# Patient Record
Sex: FEMALE | Race: WHITE | Employment: PART TIME | RURAL
[De-identification: names, ages, dates, MRNs, and addresses within clinical notes are randomized per-mention and may not be internally consistent; named-entity substitution may affect disease eponyms.]

---

## 2020-11-09 NOTE — PROGRESS NOTES
Chief Complaint   Patient presents with    Weight Gain       Have you seen any other physician or provider since your last visit no    Have you had any other diagnostic tests since your last visit? no    Have you changed or stopped any medications since your last visit? no     I have recommended that this patient have a flu shot and immunization for Tdap but she declines at this time. I have discussed the risks and benefits of this examination with her. The patient verbalizes understanding. Diabetic retinal exam completed this year?  N/A                       * If yes please have patient sign a records release to obtain record to update Health Maintenance                       * If no, please order referral for patient to be scheduled         Health Maintenance Due This Visit   Colonoscopy No   Mammogram No   Annual Wellness Visit No   Microalbumin No   HgbA1C No   Diabetic Eye Exam No    House Bill One Due This Visit   PETER Yes   UDS No   Contract No

## 2020-11-10 ENCOUNTER — HOSPITAL ENCOUNTER (OUTPATIENT)
Facility: HOSPITAL | Age: 20
Discharge: HOME | End: 2020-11-10
Payer: MEDICAID

## 2020-11-10 ENCOUNTER — OFFICE VISIT (OUTPATIENT)
Dept: FAMILY MEDICINE CLINIC | Age: 20
End: 2020-11-10
Payer: MEDICAID

## 2020-11-10 VITALS
HEART RATE: 107 BPM | TEMPERATURE: 98.3 F | HEIGHT: 67 IN | OXYGEN SATURATION: 98 % | WEIGHT: 255.4 LBS | RESPIRATION RATE: 18 BRPM | BODY MASS INDEX: 40.09 KG/M2 | SYSTOLIC BLOOD PRESSURE: 128 MMHG | DIASTOLIC BLOOD PRESSURE: 86 MMHG

## 2020-11-10 LAB
A/G RATIO: 1.8 (ref 0.8–2)
ALBUMIN SERPL-MCNC: 4.6 G/DL (ref 3.4–4.8)
ALP BLD-CCNC: 84 U/L (ref 25–100)
ALT SERPL-CCNC: 19 U/L (ref 4–36)
ANION GAP SERPL CALCULATED.3IONS-SCNC: 11 MMOL/L (ref 3–16)
AST SERPL-CCNC: 17 U/L (ref 8–33)
BASOPHILS ABSOLUTE: 0.1 K/UL (ref 0–0.1)
BASOPHILS RELATIVE PERCENT: 1 %
BILIRUB SERPL-MCNC: 0.5 MG/DL (ref 0.3–1.2)
BUN BLDV-MCNC: 15 MG/DL (ref 6–20)
CALCIUM SERPL-MCNC: 10.1 MG/DL (ref 8.5–10.5)
CHLORIDE BLD-SCNC: 103 MMOL/L (ref 98–107)
CO2: 23 MMOL/L (ref 20–30)
CREAT SERPL-MCNC: 0.7 MG/DL (ref 0.4–1.2)
EOSINOPHILS ABSOLUTE: 0.3 K/UL (ref 0–0.4)
EOSINOPHILS RELATIVE PERCENT: 3.1 %
ESTRADIOL LEVEL: 26 PG/ML
FOLLICLE STIMULATING HORMONE: 6.6 MIU/ML
GFR AFRICAN AMERICAN: >59
GFR NON-AFRICAN AMERICAN: >60
GLOBULIN: 2.5 G/DL
GLUCOSE BLD-MCNC: 108 MG/DL (ref 74–106)
HCT VFR BLD CALC: 47.9 % (ref 37–47)
HEMOGLOBIN: 14.6 G/DL (ref 11.5–16.5)
IMMATURE GRANULOCYTES #: 0 K/UL
IMMATURE GRANULOCYTES %: 0.4 % (ref 0–5)
LYMPHOCYTES ABSOLUTE: 1.3 K/UL (ref 1.5–4)
LYMPHOCYTES RELATIVE PERCENT: 16.4 %
MCH RBC QN AUTO: 26.6 PG (ref 27–32)
MCHC RBC AUTO-ENTMCNC: 30.5 G/DL (ref 31–35)
MCV RBC AUTO: 87.4 FL (ref 80–100)
MONOCYTES ABSOLUTE: 0.9 K/UL (ref 0.2–0.8)
MONOCYTES RELATIVE PERCENT: 10.8 %
NEUTROPHILS ABSOLUTE: 5.5 K/UL (ref 2–7.5)
NEUTROPHILS RELATIVE PERCENT: 68.3 %
PDW BLD-RTO: 13.2 % (ref 11–16)
PLATELET # BLD: 360 K/UL (ref 150–400)
PMV BLD AUTO: 9.3 FL (ref 6–10)
POTASSIUM SERPL-SCNC: 4.7 MMOL/L (ref 3.4–5.1)
RBC # BLD: 5.48 M/UL (ref 3.8–5.8)
SODIUM BLD-SCNC: 137 MMOL/L (ref 136–145)
TOTAL PROTEIN: 7.1 G/DL (ref 6.4–8.3)
TSH REFLEX: 1.68 UIU/ML (ref 0.35–5.5)
WBC # BLD: 8.1 K/UL (ref 4–11)

## 2020-11-10 PROCEDURE — 84270 ASSAY OF SEX HORMONE GLOBUL: CPT

## 2020-11-10 PROCEDURE — 82670 ASSAY OF TOTAL ESTRADIOL: CPT

## 2020-11-10 PROCEDURE — 83516 IMMUNOASSAY NONANTIBODY: CPT

## 2020-11-10 PROCEDURE — 99203 OFFICE O/P NEW LOW 30 MIN: CPT | Performed by: NURSE PRACTITIONER

## 2020-11-10 PROCEDURE — 85025 COMPLETE CBC W/AUTO DIFF WBC: CPT

## 2020-11-10 PROCEDURE — 83001 ASSAY OF GONADOTROPIN (FSH): CPT

## 2020-11-10 PROCEDURE — 84443 ASSAY THYROID STIM HORMONE: CPT

## 2020-11-10 PROCEDURE — 84403 ASSAY OF TOTAL TESTOSTERONE: CPT

## 2020-11-10 PROCEDURE — 80053 COMPREHEN METABOLIC PANEL: CPT

## 2020-11-10 RX ORDER — PHENTERMINE HYDROCHLORIDE 37.5 MG/1
37.5 TABLET ORAL
Qty: 30 TABLET | Refills: 0 | Status: SHIPPED | OUTPATIENT
Start: 2020-11-10 | End: 2020-12-10 | Stop reason: SDUPTHER

## 2020-11-10 RX ORDER — DICLOFENAC SODIUM 75 MG/1
75 TABLET, DELAYED RELEASE ORAL DAILY
COMMUNITY
Start: 2020-08-26

## 2020-11-10 RX ORDER — PRENATAL VIT/IRON FUM/FOLIC AC 27MG-0.8MG
0.8 TABLET ORAL DAILY
COMMUNITY
Start: 2020-09-15

## 2020-11-10 RX ORDER — LEVONORGESTREL AND ETHINYL ESTRADIOL 0.15-0.03
1 KIT ORAL DAILY
Qty: 1 PACKET | Refills: 3 | Status: SHIPPED | OUTPATIENT
Start: 2020-11-10 | End: 2020-12-10 | Stop reason: SDUPTHER

## 2020-11-10 SDOH — HEALTH STABILITY: MENTAL HEALTH: HOW OFTEN DO YOU HAVE A DRINK CONTAINING ALCOHOL?: MONTHLY OR LESS

## 2020-11-10 ASSESSMENT — PATIENT HEALTH QUESTIONNAIRE - PHQ9
2. FEELING DOWN, DEPRESSED OR HOPELESS: 0
SUM OF ALL RESPONSES TO PHQ QUESTIONS 1-9: 2
SUM OF ALL RESPONSES TO PHQ QUESTIONS 1-9: 2
1. LITTLE INTEREST OR PLEASURE IN DOING THINGS: 2
SUM OF ALL RESPONSES TO PHQ9 QUESTIONS 1 & 2: 2
SUM OF ALL RESPONSES TO PHQ QUESTIONS 1-9: 2

## 2020-11-12 LAB
SEX HORMONE BINDING GLOBULIN: 7 NMOL/L (ref 30–135)
SEX HORMONE BINDING GLOBULIN: 8 NMOL/L (ref 30–135)
TESTOSTERONE FREE-NONMALE: 12.9 PG/ML (ref 0.8–7.4)
TESTOSTERONE TOTAL: 40 NG/DL (ref 20–70)

## 2020-11-13 LAB — ANTI MULLERIAN HORMONE: 3.89 NG/ML (ref 0.4–16.02)

## 2020-11-18 ENCOUNTER — TELEPHONE (OUTPATIENT)
Dept: FAMILY MEDICINE CLINIC | Age: 20
End: 2020-11-18

## 2020-11-18 NOTE — TELEPHONE ENCOUNTER
Patient scheduled to see Dr. Domitila Salas (Gynecology) on 01/8/20 at 10. Patient's referral, along with all pertinent medical records faxed to the attention of scheduling on 11/18/20. I have mailed patient referral with appointment date/time/location.

## 2020-11-20 ASSESSMENT — ENCOUNTER SYMPTOMS
EYE REDNESS: 0
NAUSEA: 0
COUGH: 0
DIARRHEA: 0
CHEST TIGHTNESS: 0
EYE PAIN: 0
BACK PAIN: 0
SHORTNESS OF BREATH: 0
COLOR CHANGE: 0
VOMITING: 0
TROUBLE SWALLOWING: 0
ABDOMINAL PAIN: 0
SORE THROAT: 0

## 2020-11-21 NOTE — PROGRESS NOTES
SUBJECTIVE:    Patient ID: Shruthi Alcantara is a 23 y.o. female. Chief Complaint   Patient presents with    Weight Gain     Shruthi Alcantara is a 23 y.o. female here for discussion regarding weight loss. She has noted a weight gain of approximately 50 pounds over the last several months. She feels ideal weight is 190 pounds. Weight at graduation from high school was 190 pounds. History of eating disorders: none. There is a family history positive for obesity in the parents. Previous treatments for obesity include commercial weight loss program, OTC appetite suppressants, and self-directed dieting. Obesity associated medical conditions: amenorrhea. Obesity associated medications: none. Cardiovascular risk factors besides obesity: sedentary lifestyle. Active Ambulatory Problems     Diagnosis Date Noted    No Active Ambulatory Problems     Resolved Ambulatory Problems     Diagnosis Date Noted    No Resolved Ambulatory Problems     No Additional Past Medical History     Allergies   Allergen Reactions    Penicillins Rash      Past Surgical History:   Procedure Laterality Date    APPENDECTOMY      WRIST SURGERY        History reviewed. No pertinent family history. Patient's medications, allergies, past medical, surgical, social and family histories were reviewed and updated as appropriate. Current Outpatient Medications on File Prior to Visit   Medication Sig Dispense Refill    diclofenac (VOLTAREN) 75 MG EC tablet Take 75 mg by mouth daily      Prenatal Vit-Fe Fumarate-FA (PRENATAL VITAMIN) 27-0.8 MG TABS Take 0.8 mg by mouth daily       No current facility-administered medications on file prior to visit. Review of Systems   Constitutional: Positive for fatigue and unexpected weight change (gain over the past year). Negative for activity change, appetite change, chills and fever. HENT: Negative for congestion, ear pain, nosebleeds, sore throat and trouble swallowing.     Eyes: Negative for pain and redness. Respiratory: Negative for cough, chest tightness and shortness of breath. Cardiovascular: Negative for chest pain, palpitations and leg swelling. Gastrointestinal: Negative for abdominal pain, diarrhea, nausea and vomiting. Genitourinary: Negative for difficulty urinating, dysuria and flank pain. Musculoskeletal: Negative for arthralgias, back pain and gait problem. Skin: Negative for color change, pallor, rash and wound. Allergic/Immunologic: Negative for environmental allergies and food allergies. Neurological: Negative for dizziness, numbness and headaches. Hematological: Negative for adenopathy. Does not bruise/bleed easily. Psychiatric/Behavioral: Negative for agitation and sleep disturbance. The patient is not nervous/anxious. OBJECTIVE:  /86   Pulse 107   Temp 98.3 °F (36.8 °C) (Temporal)   Resp 18   Ht 5' 7\" (1.702 m)   Wt 255 lb 6.4 oz (115.8 kg)   SpO2 98% Comment: room air  BMI 40.00 kg/m²       Physical Exam  Vitals signs and nursing note reviewed. Constitutional:       General: She is not in acute distress. Appearance: Normal appearance. She is well-developed. She is obese. She is not ill-appearing, toxic-appearing or diaphoretic. Comments: Body Mass Index:  40.00 kg/m²     HENT:      Head: Normocephalic and atraumatic. Right Ear: Hearing, tympanic membrane, ear canal and external ear normal.      Left Ear: Hearing, tympanic membrane, ear canal and external ear normal.      Nose: Nose normal. No laceration, mucosal edema or rhinorrhea. Right Sinus: No maxillary sinus tenderness or frontal sinus tenderness. Left Sinus: No maxillary sinus tenderness or frontal sinus tenderness. Mouth/Throat:      Dentition: Normal dentition. No dental caries. Pharynx: Uvula midline. Tonsils: No tonsillar exudate. Eyes:      General: Lids are normal. No scleral icterus. Right eye: No discharge. Left eye: No discharge. Conjunctiva/sclera: Conjunctivae normal.      Pupils: Pupils are equal, round, and reactive to light. Neck:      Musculoskeletal: Full passive range of motion without pain, normal range of motion and neck supple. Thyroid: No thyroid mass or thyromegaly. Vascular: Normal carotid pulses. No carotid bruit, hepatojugular reflux or JVD. Trachea: Trachea and phonation normal. No tracheal tenderness or tracheal deviation. Cardiovascular:      Rate and Rhythm: Normal rate and regular rhythm. Pulses: Normal pulses. Heart sounds: Normal heart sounds, S1 normal and S2 normal. Heart sounds not distant. No murmur. No friction rub. No gallop. Pulmonary:      Effort: Pulmonary effort is normal. No tachypnea, bradypnea or accessory muscle usage. Breath sounds: Normal breath sounds. No stridor. No decreased breath sounds, wheezing, rhonchi or rales. Chest:      Chest wall: No tenderness. Abdominal:      General: Bowel sounds are normal. There is no distension. Palpations: Abdomen is soft. There is no hepatomegaly, splenomegaly or mass. Tenderness: There is no abdominal tenderness. There is no guarding or rebound. Hernia: No hernia is present. Musculoskeletal: Normal range of motion. General: No tenderness or deformity. Lymphadenopathy:      Cervical: No cervical adenopathy. Skin:     General: Skin is warm and dry. Capillary Refill: Capillary refill takes less than 2 seconds. Coloration: Skin is not pale. Findings: No bruising, erythema or rash. Neurological:      Mental Status: She is alert and oriented to person, place, and time. She is not disoriented. GCS: GCS eye subscore is 4. GCS verbal subscore is 5. GCS motor subscore is 6. Cranial Nerves: No cranial nerve deficit. Sensory: No sensory deficit. Motor: No abnormal muscle tone.       Coordination: Coordination normal.      Deep Tendon Reflexes: Reflexes normal.   Psychiatric:         Speech: Speech normal.         Behavior: Behavior normal.         Thought Content: Thought content normal.         Judgment: Judgment normal.         Results in Past 30 Days  Result Component Current Result Ref Range Previous Result Ref Range   Alb 4.6 (11/10/2020) 3.4 - 4.8 g/dL Not in Time Range    Albumin/Globulin Ratio 1.8 (11/10/2020) 0.8 - 2.0 Not in Time Range    Alkaline Phosphatase 84 (11/10/2020) 25 - 100 U/L Not in Time Range    ALT 19 (11/10/2020) 4 - 36 U/L Not in Time Range    AST 17 (11/10/2020) 8 - 33 U/L Not in Time Range    BUN 15 (11/10/2020) 6 - 20 mg/dL Not in Time Range    Calcium 10.1 (11/10/2020) 8.5 - 10.5 mg/dL Not in Time Range    Chloride 103 (11/10/2020) 98 - 107 mmol/L Not in Time Range    CO2 23 (11/10/2020) 20 - 30 mmol/L Not in Time Range    CREATININE 0.7 (11/10/2020) 0.4 - 1.2 mg/dL Not in Time Range    GFR  >59 (11/10/2020) >59 Not in Time Range    GFR Non- >60 (11/10/2020) >59 Not in Time Range    Globulin 2.5 (11/10/2020) g/dL Not in Time Range    Glucose 108 (H) (11/10/2020) 74 - 106 mg/dL Not in Time Range    Potassium 4.7 (11/10/2020) 3.4 - 5.1 mmol/L Not in Time Range    Sodium 137 (11/10/2020) 136 - 145 mmol/L Not in Time Range    Total Bilirubin 0.5 (11/10/2020) 0.3 - 1.2 mg/dL Not in Time Range    Total Protein 7.1 (11/10/2020) 6.4 - 8.3 g/dL Not in Time Range      No results found for: LABA1C, LABMICR, LDLCALC    Lab Results   Component Value Date    WBC 8.1 11/10/2020    NEUTROABS 5.5 11/10/2020    HGB 14.6 11/10/2020    HCT 47.9 11/10/2020    MCV 87.4 11/10/2020     11/10/2020     No results found for: TSH     ASSESSMENT/PLAN:     Mariama Jackman was seen today for weight gain. Diagnoses and all orders for this visit:    BMI 40.0-44.9, adult (HCC)  -     phentermine (ADIPEX-P) 37.5 MG tablet; Take 1 tablet by mouth every morning (before breakfast) for 30 days.     Menstrual periods, abnormal  - levonorgestrel-ethinyl estradiol (SEASONALE) 0.15-0.03 MG per tablet; Take 1 tablet by mouth daily  -     Estradiol; Future  -     Testosterone, free, total; Future  -     FOLLICLE STIMULATING HORMONE; Future  -     SEX HORMONE BINDING GLOBULIN; Future  -     ANTI MULLERIAN HORMONE; Future    Weight gain  -     External Referral To Ob-Gyn  -     COMPREHENSIVE METABOLIC PANEL; Future  -     TSH with Reflex; Future  -     CBC WITH AUTO DIFFERENTIAL; Future  -     Estradiol; Future  -     Testosterone, free, total; Future  -     FOLLICLE STIMULATING HORMONE; Future  -     SEX HORMONE BINDING GLOBULIN; Future  -     ANTI MULLERIAN HORMONE; Future    I had a long discussion with patient regarding the risk and complication from obesity. She is aware. I have a long discussion with her about the importance of following appropriate diet and exercise on a regular basis. I have discussed with her several diet option. Work up to rule out secondary causes of obesity will be done. Risks of dieting were reviewed, including fatigue, temporary hair loss, gallstone formation, gout, and with very low calorie diets, electrolyte abnormalities, nutrient inadequacies, and loss of lean body mass. Proper food choices reviewed. Also, discuss with her several treatment options including surgery and other medications. Patient elected to start on Phentermine. She is aware about the risk and possible side effect. She is aware that it will be for short term only. I will re-assess her response in the next few weeks. Patient tolerating all prescribed medications without any adverse side effects. Continues with current plan of care in treating diagnosis. Continue to stay well hydrated throughout the day, limit caffeine, regular exercise discussed with the patient. Heathy diet discussed with the patient.             Controlled Substances Monitoring:     RX Monitoring 11/20/2020   Attestation The Prescription Monitoring Report for this patient

## 2020-12-10 ENCOUNTER — VIRTUAL VISIT (OUTPATIENT)
Dept: FAMILY MEDICINE CLINIC | Age: 20
End: 2020-12-10
Payer: MEDICAID

## 2020-12-10 PROCEDURE — 99442 PR PHYS/QHP TELEPHONE EVALUATION 11-20 MIN: CPT | Performed by: NURSE PRACTITIONER

## 2020-12-10 RX ORDER — LEVONORGESTREL AND ETHINYL ESTRADIOL 0.15-0.03
1 KIT ORAL DAILY
Qty: 1 PACKET | Refills: 3 | Status: SHIPPED | OUTPATIENT
Start: 2020-12-10 | End: 2021-02-08 | Stop reason: SINTOL

## 2020-12-10 RX ORDER — PHENTERMINE HYDROCHLORIDE 37.5 MG/1
37.5 TABLET ORAL
Qty: 30 TABLET | Refills: 0 | Status: SHIPPED | OUTPATIENT
Start: 2020-12-10 | End: 2021-01-09

## 2020-12-10 ASSESSMENT — ENCOUNTER SYMPTOMS
NAUSEA: 0
VOMITING: 0
SHORTNESS OF BREATH: 0
EYE PAIN: 0
EYE REDNESS: 0
COLOR CHANGE: 0
TROUBLE SWALLOWING: 0
BACK PAIN: 0
DIARRHEA: 0
ABDOMINAL PAIN: 0
CHEST TIGHTNESS: 0
COUGH: 0
SORE THROAT: 0

## 2020-12-10 NOTE — PROGRESS NOTES
SUBJECTIVE:    Patient ID: Giovana Wheatley is a 23 y.o. female. Chief Complaint   Patient presents with    Weight Gain     Requesting refills on adipex. Continues to take as prescribed with + results in appetite control and weight loss.  Contraception     Requesting to start on birth control. Telephone encounter with 23 y.o. female here for follow-up on weight loss, requesting refills on adipex and would like to be started back on birthcontrol. She has noted a weight gain of approximately 50 pounds over the last several months. She feels ideal weight is 190 pounds. Weight at graduation from high school was 190 pounds. History of eating disorders: none. There is a family history positive for obesity in the parents. Previous treatments for obesity include commercial weight loss program, OTC appetite suppressants, and self-directed dieting. Obesity associated medical conditions: amenorrhea. Obesity associated medications: none. Cardiovascular risk factors besides obesity: sedentary lifestyle. Active Ambulatory Problems     Diagnosis Date Noted    No Active Ambulatory Problems     Resolved Ambulatory Problems     Diagnosis Date Noted    No Resolved Ambulatory Problems     No Additional Past Medical History     Allergies   Allergen Reactions    Penicillins Rash      Past Surgical History:   Procedure Laterality Date    APPENDECTOMY      WRIST SURGERY        No family history on file. Patient's medications, allergies, past medical, surgical, social and family histories were reviewed and updated as appropriate. Current Outpatient Medications on File Prior to Visit   Medication Sig Dispense Refill    diclofenac (VOLTAREN) 75 MG EC tablet Take 75 mg by mouth daily      Prenatal Vit-Fe Fumarate-FA (PRENATAL VITAMIN) 27-0.8 MG TABS Take 0.8 mg by mouth daily       No current facility-administered medications on file prior to visit.         Review of Systems Constitutional: Positive for fatigue and unexpected weight change (taking Adipex with + results. ). Negative for activity change, appetite change, chills and fever. HENT: Negative for congestion, ear pain, nosebleeds, sore throat and trouble swallowing. Eyes: Negative for pain and redness. Respiratory: Negative for cough, chest tightness and shortness of breath. Cardiovascular: Negative for chest pain, palpitations and leg swelling. Gastrointestinal: Negative for abdominal pain, diarrhea, nausea and vomiting. Genitourinary: Negative for difficulty urinating, dysuria and flank pain. Musculoskeletal: Negative for arthralgias, back pain and gait problem. Skin: Negative for color change, pallor, rash and wound. Allergic/Immunologic: Negative for environmental allergies and food allergies. Neurological: Negative for dizziness, numbness and headaches. Hematological: Negative for adenopathy. Does not bruise/bleed easily. Psychiatric/Behavioral: Negative for agitation and sleep disturbance. The patient is not nervous/anxious. OBJECTIVE:  There were no vitals taken for this visit. Due to the current efforts to prevent transmission of COVID-19 and also the need to preserve PPE for other caregivers, a face-to-face encounter with the patient was not performed. That being said, all relevant records and diagnostic tests were reviewed, including laboratory results and imaging. Please reference any relevant documentation elsewhere. Care will be coordinated with the primary service. No results found for requested labs within last 30 days. No results found for: [place], LDLCALC    Lab Results   Component Value Date    WBC 8.1 11/10/2020    NEUTROABS 5.5 11/10/2020    HGB 14.6 11/10/2020    HCT 47.9 11/10/2020    MCV 87.4 11/10/2020     11/10/2020     No results found for: TSH     ASSESSMENT/PLAN:     Tony Munguia was seen today for weight gain and contraception. Diagnoses and all orders for this visit:    BMI 40.0-44.9, adult (HCC)  -     phentermine (ADIPEX-P) 37.5 MG tablet; Take 1 tablet by mouth every morning (before breakfast) for 30 days. Menstrual periods, abnormal  -     levonorgestrel-ethinyl estradiol (SEASONALE) 0.15-0.03 MG per tablet; Take 1 tablet by mouth daily    I had a long discussion with patient regarding the risk and complication from obesity. She is aware. I have a long discussion with her about the importance of following appropriate diet and exercise on a regular basis. I have discussed with her several diet option. Work up to rule out secondary causes of obesity will be done. Risks of dieting were reviewed, including fatigue, temporary hair loss, gallstone formation, gout, and with very low calorie diets, electrolyte abnormalities, nutrient inadequacies, and loss of lean body mass. Proper food choices reviewed. Also, discuss with her several treatment options including surgery and other medications. Patient elected to start on Phentermine. She is aware about the risk and possible side effect. She is aware that it will be for short term only. I will re-assess her response in the next few weeks. Patient tolerating all prescribed medications without any adverse side effects. Continues with current plan of care in treating diagnosis. Continue to stay well hydrated throughout the day, limit caffeine, regular exercise discussed with the patient. Heathy diet discussed with the patient. Telephone encounter: 15 minutes spent. Per patient scheduling. Controlled Substances Monitoring:     RX Monitoring 11/20/2020   Attestation The Prescription Monitoring Report for this patient was reviewed today. Periodic Controlled Substance Monitoring Possible medication side effects, risk of tolerance/dependence & alternative treatments discussed. ;Random urine drug screen sent today. ;Assessed functional status. ;No signs of potential drug abuse or diversion identified. PATIENT COUNSELING     Counseling was provided today regarding the following topics: Healthy eating habits, Regular exercise, substance abuse and healthy sleep habits. Discussed use, benefit, and side effects of prescribed medications. Barriers to medication compliance addressed. All patient questions answered. Patient voiced understanding. Medications Discontinued During This Encounter   Medication Reason    phentermine (ADIPEX-P) 37.5 MG tablet REORDER    levonorgestrel-ethinyl estradiol (SEASONALE) 0.15-0.03 MG per tablet REORDER       Return in about 1 month (around 1/10/2021), or if symptoms worsen or fail to improve, for VV, medication follow-up. Colletta Coward, APRN - CNP     Education was provided for discussed topics. Call the office with worsening complaints or any side effects to any medications. If an emergency please call 911. Please note: This chart was generated using Dragon dictation software. Although every effort was made to ensure the accuracy of this automated transcription, some errors in transcription may have occurred.

## 2021-02-08 ENCOUNTER — VIRTUAL VISIT (OUTPATIENT)
Dept: FAMILY MEDICINE CLINIC | Age: 21
End: 2021-02-08
Payer: MEDICAID

## 2021-02-08 DIAGNOSIS — N92.6 MENSTRUAL PERIODS, ABNORMAL: ICD-10-CM

## 2021-02-08 DIAGNOSIS — N76.0 BACTERIAL VAGINOSIS: Primary | ICD-10-CM

## 2021-02-08 DIAGNOSIS — B96.89 BACTERIAL VAGINOSIS: Primary | ICD-10-CM

## 2021-02-08 PROCEDURE — 99442 PR PHYS/QHP TELEPHONE EVALUATION 11-20 MIN: CPT | Performed by: NURSE PRACTITIONER

## 2021-02-08 RX ORDER — NORGESTIMATE AND ETHINYL ESTRADIOL 0.25-0.035
1 KIT ORAL DAILY
Qty: 1 PACKET | Refills: 3 | Status: SHIPPED | OUTPATIENT
Start: 2021-02-08

## 2021-02-08 RX ORDER — METRONIDAZOLE 500 MG/1
500 TABLET ORAL 2 TIMES DAILY
Qty: 14 TABLET | Refills: 0 | Status: SHIPPED | OUTPATIENT
Start: 2021-02-08 | End: 2021-02-15

## 2021-02-08 ASSESSMENT — ENCOUNTER SYMPTOMS
NAUSEA: 0
DIARRHEA: 0
CHEST TIGHTNESS: 0
TROUBLE SWALLOWING: 0
EYE REDNESS: 0
BACK PAIN: 0
ABDOMINAL PAIN: 0
SORE THROAT: 0
SHORTNESS OF BREATH: 0
VOMITING: 0
COUGH: 0
COLOR CHANGE: 0
EYE PAIN: 0

## 2021-02-08 NOTE — PROGRESS NOTES
Patient's medications, allergies, past medical, surgical, social and family histories were reviewed and updated as appropriate. Current Outpatient Medications on File Prior to Visit   Medication Sig Dispense Refill    diclofenac (VOLTAREN) 75 MG EC tablet Take 75 mg by mouth daily      Prenatal Vit-Fe Fumarate-FA (PRENATAL VITAMIN) 27-0.8 MG TABS Take 0.8 mg by mouth daily       No current facility-administered medications on file prior to visit. Review of Systems   Constitutional: Negative for activity change, appetite change, chills and fever. HENT: Negative for congestion, ear pain, nosebleeds, sore throat and trouble swallowing. Eyes: Negative for pain and redness. Respiratory: Negative for cough, chest tightness and shortness of breath. Cardiovascular: Negative for chest pain, palpitations and leg swelling. Gastrointestinal: Negative for abdominal pain, diarrhea, nausea and vomiting. Genitourinary: Positive for vaginal discharge. Negative for difficulty urinating, dysuria, flank pain, frequency and urgency. Musculoskeletal: Negative for arthralgias, back pain and gait problem. Skin: Negative for color change, pallor, rash and wound. Allergic/Immunologic: Negative for environmental allergies and food allergies. Neurological: Negative for dizziness, numbness and headaches. Hematological: Negative for adenopathy. Does not bruise/bleed easily. Psychiatric/Behavioral: Negative for agitation and sleep disturbance. The patient is not nervous/anxious. OBJECTIVE:  There were no vitals taken for this visit.     Physical Exam Due to the current efforts to prevent transmission of COVID-19 and also the need to preserve PPE for other caregivers, a face-to-face encounter with the patient was not performed. That being said, all relevant records and diagnostic tests were reviewed, including laboratory results and imaging. Please reference any relevant documentation elsewhere. Care will be coordinated with the primary service. No results found for requested labs within last 30 days. No results found for: Deanna Dudley LDLCALC    Lab Results   Component Value Date    WBC 8.1 11/10/2020    NEUTROABS 5.5 11/10/2020    HGB 14.6 11/10/2020    HCT 47.9 11/10/2020    MCV 87.4 11/10/2020     11/10/2020     No results found for: TSH     ASSESSMENT/PLAN:     Marie was seen today for vaginal discharge and contraception. Diagnoses and all orders for this visit:    Bacterial vaginosis  -     metroNIDAZOLE (FLAGYL) 500 MG tablet; Take 1 tablet by mouth 2 times daily for 7 days    Menstrual periods, abnormal  -     norgestimate-ethinyl estradiol (ORTHO-CYCLEN, 28,) 0.25-35 MG-MCG per tablet; Take 1 tablet by mouth daily      Telephone encounter: 15 minutes spent with patient discussing care and new plan of care. Medications/Contraceptives Affecting Cytology     Combination Contraceptives - Oral Disp Start End     norgestimate-ethinyl estradiol (ORTHO-CYCLEN, 28,) 0.25-35 MG-MCG per   tablet    1 packet 2/8/2021     Sig: Take 1 tablet by mouth daily    Route: Oral       The current method of family planning is OCP (estrogen/progesterone). Controlled Substances Monitoring:     RX Monitoring 11/20/2020   Attestation The Prescription Monitoring Report for this patient was reviewed today. Periodic Controlled Substance Monitoring Possible medication side effects, risk of tolerance/dependence & alternative treatments discussed. ;Random urine drug screen sent today. ;Assessed functional status. ;No signs of potential drug abuse or diversion identified. PATIENT COUNSELING     Counseling was provided today regarding the following topics: Healthy eating habits, Regular exercise, substance abuse and healthy sleep habits. Discussed use, benefit, and side effects of prescribed medications. Barriers to medication compliance addressed. All patient questions answered. Patient voiced understanding. Medications Discontinued During This Encounter   Medication Reason    levonorgestrel-ethinyl estradiol (SEASONALE) 0.15-0.03 MG per tablet Side effects       Return if symptoms worsen or fail to improve. ALCIRA Bee CNP     Education was provided for discussed topics. Call the office with worsening complaints or any side effects to any medications. If an emergency please call 911. Please note: This chart was generated using Dragon dictation software. Although every effort was made to ensure the accuracy of this automated transcription, some errors in transcription may have occurred.

## 2022-03-03 ENCOUNTER — TELEPHONE (OUTPATIENT)
Dept: FAMILY MEDICINE CLINIC | Age: 22
End: 2022-03-03

## 2022-03-03 NOTE — TELEPHONE ENCOUNTER
----- Message from Bonny Dhillon sent at 3/2/2022  4:46 PM EST -----  Subject: Message to Provider    QUESTIONS  Information for Provider? please call patient back about paperwork  ---------------------------------------------------------------------------  --------------  CALL BACK INFO  What is the best way for the office to contact you? OK to leave message on   voicemail  Preferred Call Back Phone Number? [phone]  ---------------------------------------------------------------------------  --------------  SCRIPT ANSWERS  Relationship to Patient?  Self
Spoke with patient and notified her that we haven't received a records request from [facility]. She stated that she would call them and have them resend it.
yes